# Patient Record
Sex: MALE | NOT HISPANIC OR LATINO | URBAN - METROPOLITAN AREA
[De-identification: names, ages, dates, MRNs, and addresses within clinical notes are randomized per-mention and may not be internally consistent; named-entity substitution may affect disease eponyms.]

---

## 2017-01-03 ENCOUNTER — IMPORTED ENCOUNTER (OUTPATIENT)
Dept: URBAN - METROPOLITAN AREA CLINIC 1 | Facility: CLINIC | Age: 77
End: 2017-01-03

## 2017-01-03 PROBLEM — H25.813: Noted: 2017-01-03

## 2017-01-03 PROCEDURE — 92136 OPHTHALMIC BIOMETRY: CPT

## 2017-01-03 NOTE — PATIENT DISCUSSION
1. Cataract OU:  Visually Significant secondary to glare discussed the risks benefits alternatives and limitations of cataract surgery. The patient stated a full understanding and a desire to proceed with the procedure. Not MF candidate due to amount of Astigmatism/ AMD. Pt understands they will need glasses post-op to for reading/ certain distances. Patient relays he would like specs PO as he does not like carrying glasses around with him he would rather wear them daily. Phaco PCL OS then OD. Toric lens standard technique.

## 2017-01-11 ENCOUNTER — IMPORTED ENCOUNTER (OUTPATIENT)
Dept: URBAN - METROPOLITAN AREA CLINIC 1 | Facility: CLINIC | Age: 77
End: 2017-01-11

## 2017-01-12 ENCOUNTER — IMPORTED ENCOUNTER (OUTPATIENT)
Dept: URBAN - METROPOLITAN AREA CLINIC 1 | Facility: CLINIC | Age: 77
End: 2017-01-12

## 2017-01-12 PROBLEM — Z96.1: Noted: 2017-01-12

## 2017-01-12 PROCEDURE — 99024 POSTOP FOLLOW-UP VISIT: CPT

## 2017-01-12 NOTE — PATIENT DISCUSSION
1. POD#1 CE/IOL Toric OS doing well. Slight increased IOP OS. X 1 gtt Combigan instilled OS in office today. Continue all 3 gtts as prescribed and until gone. Ocuflox TIDDurezol BIDIlevro QDPost op Warnings Reiterated 2. Return for an appointment for H&P OD as scheduled with Dr. Lolis Lua.

## 2017-01-17 ENCOUNTER — IMPORTED ENCOUNTER (OUTPATIENT)
Dept: URBAN - METROPOLITAN AREA CLINIC 1 | Facility: CLINIC | Age: 77
End: 2017-01-17

## 2017-01-17 PROBLEM — H25.811: Noted: 2017-01-17

## 2017-01-17 PROBLEM — Z96.1: Noted: 2017-01-17

## 2017-01-17 PROCEDURE — 92136 OPHTHALMIC BIOMETRY: CPT

## 2017-01-17 NOTE — PATIENT DISCUSSION
1.  Cataract OD: Visually Significant secondary to glare discussed the risks benefits alternatives and limitations of cataract surgery. The patient stated a full understanding and a desire to proceed with the procedure. Pt understands they will need glasses post-op to achieve their best vision. Phaco PCL OD Toric lens standard technique. 2.  POW#1  CE/IOL OS doing well.   Durezol BID OS Ilevro Qdaily OS Ocuflox TID OS

## 2017-01-25 ENCOUNTER — IMPORTED ENCOUNTER (OUTPATIENT)
Dept: URBAN - METROPOLITAN AREA CLINIC 1 | Facility: CLINIC | Age: 77
End: 2017-01-25

## 2017-01-26 ENCOUNTER — IMPORTED ENCOUNTER (OUTPATIENT)
Dept: URBAN - METROPOLITAN AREA CLINIC 1 | Facility: CLINIC | Age: 77
End: 2017-01-26

## 2017-01-26 PROBLEM — Z96.1: Noted: 2017-01-26

## 2017-01-26 PROCEDURE — 99024 POSTOP FOLLOW-UP VISIT: CPT

## 2017-01-26 NOTE — PATIENT DISCUSSION
1. POD#1 CE/IOL Toric OD doing well. Continue all 3 gtts as prescribed and until gone. Ocuflox TIDDurezol BIDIlevro QDPost op Warnings Reiterated 2. POW#2  CE/IOL Toric OS doing well. Continue Durezol BID until gone. Continue Ilevro QD until gone. 3. Return for an appointment for a KR OU as scheduled with Dr. Giovanni De La Fuente.

## 2017-02-20 ENCOUNTER — IMPORTED ENCOUNTER (OUTPATIENT)
Dept: URBAN - METROPOLITAN AREA CLINIC 1 | Facility: CLINIC | Age: 77
End: 2017-02-20

## 2017-02-20 PROBLEM — Z96.1: Noted: 2017-02-20

## 2017-02-20 PROCEDURE — 99024 POSTOP FOLLOW-UP VISIT: CPT

## 2017-02-20 NOTE — PATIENT DISCUSSION
POW#3 Phaco/ PCL OU (Toric) Doing well Finish PO meds through Friday then D/c MRX for glasses given Return for an appointment in 6 months 30 with Dr. Lam Rodriguez.

## 2017-03-03 ENCOUNTER — IMPORTED ENCOUNTER (OUTPATIENT)
Dept: URBAN - METROPOLITAN AREA CLINIC 1 | Facility: CLINIC | Age: 77
End: 2017-03-03

## 2017-03-03 PROBLEM — H16.143: Noted: 2017-03-03

## 2017-03-03 PROBLEM — H26.493: Noted: 2017-03-03

## 2017-03-03 PROBLEM — H35.3131: Noted: 2017-03-03

## 2017-03-03 PROBLEM — Z96.1: Noted: 2017-03-03

## 2017-03-03 PROBLEM — D31.31: Noted: 2017-03-03

## 2017-03-03 PROBLEM — H04.123: Noted: 2017-03-03

## 2017-03-03 PROBLEM — H43.811: Noted: 2017-03-03

## 2017-03-03 PROCEDURE — 92012 INTRM OPH EXAM EST PATIENT: CPT

## 2017-03-03 NOTE — PATIENT DISCUSSION
1.  PCO OU-Visually Significant and yag cap recommended. Risks and benefits discussed with pt and pt desires to schedule yag cap. Will schedule YAG Cap OD then OS at the end of April. 2. Pseudophakia OU - Toric OU 3. ARMD OU mild/dry/stable. Importance of daily AREDS II study multivitamin and Amsler Grid checks discussed with patient. Patient to follow-up immediately with any new onset of decreased vision and/or metamorphopsia. 4. MODESTO w/ PEK OU- Recommend PF ATs TID OU routinely  5. Choroidal Nevus OD: Appears Benign and stable. Observe for changes. 6. PVD w/o Tear OD - Patient was cautioned to call our office immediately if they experience   a substantial change in their symptoms such as an increase in floaters persistent flashes loss of visual field (may appear as a shadow or a curtain) or decrease in visual acuity as these may indicate a retinal tear or detachment. **Will remake glasses after YAG Cap at no cost per PMG. Return for an appointment in After April 26 10/YAG Cap OD then YAG Cap OS 2 weeks later with Dr. Radha Barbosa.

## 2017-05-05 ENCOUNTER — IMPORTED ENCOUNTER (OUTPATIENT)
Dept: URBAN - METROPOLITAN AREA CLINIC 1 | Facility: CLINIC | Age: 77
End: 2017-05-05

## 2017-05-05 PROBLEM — H26.491: Noted: 2017-05-05

## 2017-05-05 PROCEDURE — 66821 AFTER CATARACT LASER SURGERY: CPT

## 2017-05-05 NOTE — PATIENT DISCUSSION
YAG CAP OD: (Consent signed and scanned into attachments) 1 gtt Prolensa applied. The purpose and nature of the procedure possible alternative methods of treatment the risks involved and the possibility of complications were discussed with patient. The Patient wishes to proceed and the consent was signed. The laser was then performed under topical anesthesia with no complications. Post op instructions were given to patient as well as a follow-up appointment. Patient was advised to call our office if any questions or concerns.  RTC as scheduled for 2nd eye

## 2017-05-19 ENCOUNTER — IMPORTED ENCOUNTER (OUTPATIENT)
Dept: URBAN - METROPOLITAN AREA CLINIC 1 | Facility: CLINIC | Age: 77
End: 2017-05-19

## 2017-05-19 PROBLEM — H26.492: Noted: 2017-05-19

## 2017-05-19 PROCEDURE — 66821 AFTER CATARACT LASER SURGERY: CPT

## 2017-05-19 NOTE — PATIENT DISCUSSION
YAG CAP OS: (Consent signed and scanned into attachments) 1 gtt Prolensa applied. The purpose and nature of the procedure possible alternative methods of treatment the risks involved and the possibility of complications were discussed with patient. The Patient wishes to proceed and the consent was signed. The laser was then performed under topical anesthesia with no complications. Post op instructions were given to patient as well as a follow-up appointment. Patient was advised to call our office if any questions or concerns.  RTC 6 weeks PO w/ PMG

## 2017-05-30 ENCOUNTER — IMPORTED ENCOUNTER (OUTPATIENT)
Dept: URBAN - METROPOLITAN AREA CLINIC 1 | Facility: CLINIC | Age: 77
End: 2017-05-30

## 2017-05-30 PROBLEM — H43.811: Noted: 2017-05-30

## 2017-05-30 PROCEDURE — 92012 INTRM OPH EXAM EST PATIENT: CPT

## 2017-05-30 NOTE — PATIENT DISCUSSION
1.  PVD w/o Tear OD - Symptomatic. Reassurance. RD precautions. 2.  Pseudophakia OU - (Toric OU) H/o YAG Cap OU 3. ARMD OU Mild/dry/stable. Importance of daily AREDS II study multivitamin and Amsler Grid checks discussed with patient. Patient to follow-up immediately with any new onset of decreased vision and/or metamorphopsia. 4. MODESTO w/ PEK OU- Use ATs TID OU routinely. 5.  Choroidal Nevus OD: Appears Benign and stable. Observe for changes.

## 2017-08-11 ENCOUNTER — IMPORTED ENCOUNTER (OUTPATIENT)
Dept: URBAN - METROPOLITAN AREA CLINIC 1 | Facility: CLINIC | Age: 77
End: 2017-08-11

## 2017-08-11 PROBLEM — H16.143: Noted: 2017-08-11

## 2017-08-11 PROBLEM — D31.31: Noted: 2017-08-11

## 2017-08-11 PROBLEM — H35.3131: Noted: 2017-08-11

## 2017-08-11 PROBLEM — H04.123: Noted: 2017-08-11

## 2017-08-11 PROBLEM — H43.813: Noted: 2017-08-11

## 2017-08-11 PROBLEM — Z96.1: Noted: 2017-08-11

## 2017-08-11 PROCEDURE — 92012 INTRM OPH EXAM EST PATIENT: CPT

## 2017-08-11 NOTE — PATIENT DISCUSSION
1.  MODESTO w/ PEK OU symptomatic- Increase ATs to QID routinely. Inserted Med Parasol Plugs RLL LLL today with no complications. Silicone Plug RLL and LLL:  Risks benefits and alternatives to placing plug was discussed with patient. Patient understands and would like to proceed. Consent was signed. Post op instructions were discussed/given to patient and patient was advised to call our office if any problems arose. **When pt returns in 3 months if MODESTO controlled refer to Retina for consideration of Vitrectomy. 2.  PVD OU with no holes tears or detachments RD precautions. Refer to Retina for consideration of Vitrecomy next visit if MODESTO controlled. 3.  Pseudophakia OU - (Toric OU) H/o YAG Cap OU 4. ARMD OU Mild/dry/stable. Importance of daily AREDS II study multivitamin and Amsler Grid checks discussed with patient. Patient to follow-up immediately with any new onset of decreased vision and/or metamorphopsia. 5. Choroidal Nevus OD: Appears Benign and stable. Observe for changes. Return for an appointment in 3 mo 10 (check K) with Dr. Anil Steven.

## 2017-11-02 ENCOUNTER — IMPORTED ENCOUNTER (OUTPATIENT)
Dept: URBAN - METROPOLITAN AREA CLINIC 1 | Facility: CLINIC | Age: 77
End: 2017-11-02

## 2017-11-02 PROBLEM — H04.123: Noted: 2017-11-02

## 2017-11-02 PROBLEM — H16.143: Noted: 2017-11-02

## 2017-11-02 PROBLEM — Z96.1: Noted: 2017-11-02

## 2017-11-02 PROCEDURE — 92012 INTRM OPH EXAM EST PATIENT: CPT

## 2017-11-02 NOTE — PATIENT DISCUSSION
1.  MODESTO w/ decreased PEK OU- improved post punctal occlusion but plugs fell out LL's OU. Will hold on replacing plugs at this time. Recommend the start of tear LILLI OU QHS. The continuation of artificial tears OU Q3 hwa were recommended routinely. 2.  Pseudophakia OU (Torics OU)- H/o Yag OU.3. H/o ARMD OU () FHX- mild/dry4. H/o Choroidal Nevus OD5. H/o PVD OU- Pt no longer interested in Vitrectomy6. Return for an appointment for a 27 in March with Dr. Helena Prado.

## 2018-03-06 ENCOUNTER — IMPORTED ENCOUNTER (OUTPATIENT)
Dept: URBAN - METROPOLITAN AREA CLINIC 1 | Facility: CLINIC | Age: 78
End: 2018-03-06

## 2018-03-06 PROBLEM — H43.813: Noted: 2018-03-06

## 2018-03-06 PROBLEM — H35.3131: Noted: 2018-03-06

## 2018-03-06 PROBLEM — Z96.1: Noted: 2018-03-06

## 2018-03-06 PROBLEM — H04.123: Noted: 2018-03-06

## 2018-03-06 PROBLEM — H16.143: Noted: 2018-03-06

## 2018-03-06 PROBLEM — D31.31: Noted: 2018-03-06

## 2018-03-06 PROCEDURE — 92014 COMPRE OPH EXAM EST PT 1/>: CPT

## 2018-03-06 NOTE — PATIENT DISCUSSION
1.  ARMD OU early/dry/stable. Importance of daily AREDS II study multivitamin and Amsler Grid checks discussed with patient. Patient to follow-up immediately with any new onset of decreased vision and/or metamorphopsia. 2. MODESTO w/ increased PEK OU- Progressed OU. Intolerant to [employer]. Recommend Refresh Celluvisc OU QHS. Start Xiidra OU BID (sample RX and coupon given to pt.) Advised pt to instill tear drop prior to Hong Reinaldo. Plugs fell out in past LLs. The continuation of artificial tears OU Q3hwa were recommended. 3.  Choroidal Nevus OD: Appears Benign and stable. Observe for changes. 4. PVD OU- Old stable. 5.  Pseudophakia OU (Torics OU)- H/o Yag OU6. Return for an appointment for a 10/check K and symptoms on Hong Reinaldo in 4-6 weeks with Dr. Mike Severino.

## 2018-04-17 ENCOUNTER — IMPORTED ENCOUNTER (OUTPATIENT)
Dept: URBAN - METROPOLITAN AREA CLINIC 1 | Facility: CLINIC | Age: 78
End: 2018-04-17

## 2018-04-17 PROBLEM — H16.143: Noted: 2018-04-17

## 2018-04-17 PROBLEM — H04.123: Noted: 2018-04-17

## 2018-04-17 PROCEDURE — 92012 INTRM OPH EXAM EST PATIENT: CPT

## 2018-04-17 NOTE — PATIENT DISCUSSION
1.  MODESTO w/ improved PEK OU- Progressed H/o Plugs LLs OU Plugs fell out LLs OU. H/o Intoleramce to Tear LILLI. Continue Refresh Celluvisc OU QHS. Continue Xiidra OU BID. Switch to PF ATs Q3hrs OU w/a (can use more frequently if needed) Advised pt to instill tear drop prior to Derl Slay application. 2.  H/o Choroidal Nevus OD 3. H/o PVD OU 4. Pseudophakia OU (Toric OU)- H/o YAG Cap OU5. H/o ARMD OU early/dryReturn for an appointment in 5-6 mo 10 dfe with Dr. Adriana Lovett.

## 2018-09-11 NOTE — PATIENT DISCUSSION
(H16.042) Marginal corneal ulcer, left eye - Assesment : Examination reveals staph marginal corneal ulcer OS. - Plan : Start Zylet OS tid for 1 week, then may taper. E-Rx sent to pharmacy. Explained condition. Continue ATs prn for comfort. Call if symptoms worsen, do not improve, or new symptoms or vision changes occur. RTC in 1 week for IOP Check, sooner if problems or changes.

## 2018-09-18 NOTE — PATIENT DISCUSSION
(H16.042) Marginal corneal ulcer, left eye - Assesment : Examination reveals staph marginal corneal ulcer OS, much improved. - Plan : Decrease Zylet OS to qdaily for another 3 days, then stop. Recommended regular use of ATs 2-4 times per day and prn. Refresh Jeyson-3 sample given today. Call if symptoms worsen, do not improve, or new symptoms or vision changes occur. RTC in 1-2 months for Exam, sooner if problems or changes.

## 2018-09-18 NOTE — PATIENT DISCUSSION
(G46.156) Keratoconjunct sicca, not specified as Sjogren's, bilateral - Assesment : Examination revealed Dry Eye Syndrome - Plan : Monitor for changes. Explained ALIA and handout given. Recommended regular use of ATs 2-4 times per day and prn. Refresh Jeyson-3 sample given today.

## 2018-10-29 NOTE — PATIENT DISCUSSION
(X22.864) Keratoconjunct sicca, not specified as Sjogren's, bilateral - Assesment : Examination revealed Dry Eye Syndrome - Plan : Monitor for changes. Recommended regular use of ATs 2-4 times per day and prn.

## 2018-10-29 NOTE — PATIENT DISCUSSION
(J35.860) Peripheral opacity of cornea, left eye - Assesment : Peripheral Scar @ 5:00 OS. s/p marginal ulcer OS. Hx of Lasik. - Plan : Refer to Corneal Specialist for evaluation and possible treatment.

## 2018-10-29 NOTE — PATIENT DISCUSSION
(H25.13) Age-related nuclear cataract, bilateral - Assesment : Examination revealed cataract. Mild symptoms. Hx of Monovision Lasik. - Plan : Monitor for changes. Advised patient to call our office with decreased vision or increased symptoms. Updated GLRx given today.

## 2018-11-19 ENCOUNTER — IMPORTED ENCOUNTER (OUTPATIENT)
Dept: URBAN - METROPOLITAN AREA CLINIC 1 | Facility: CLINIC | Age: 78
End: 2018-11-19

## 2018-11-19 PROBLEM — H35.033: Noted: 2018-11-19

## 2018-11-19 PROBLEM — Z96.1: Noted: 2018-11-19

## 2018-11-19 PROBLEM — H43.813: Noted: 2018-11-19

## 2018-11-19 PROBLEM — H16.143: Noted: 2018-11-19

## 2018-11-19 PROBLEM — H04.123: Noted: 2018-11-19

## 2018-11-19 PROBLEM — D31.31: Noted: 2018-11-19

## 2018-11-19 PROBLEM — H35.3131: Noted: 2018-11-19

## 2018-11-19 PROCEDURE — 99213 OFFICE O/P EST LOW 20 MIN: CPT

## 2018-11-19 NOTE — PATIENT DISCUSSION
1.  ARMD OU early/dry/stable. Importance of daily AREDS II study multivitamin and Amsler Grid checks discussed with patient. Patient to follow-up immediately with any new onset of decreased vision and/or metamorphopsia. 2. Choroidal Nevus OD: Appears Benign and stable. Observe for changes. 3. MODESTO w/ PEK OU- Unable to continue Ela Horse due to cost. No improvement w/ increased tear use. Start Restasis OU BID. Plugs fell out in past LLs OU. The continuation of PF artificial tears were recommended. Continue Refresh Celluvisc OU QHS. 4.  PVD OU- Old stable. 5.  GR I Hypertensive Retinopathy OU- continue HTN Control6. Pseudophakia OU- Torics OU- Doing well. 7. Return for an appointment for 10/check K on Restasis March w/ Dr. Paige Ortiz.

## 2019-03-19 ENCOUNTER — IMPORTED ENCOUNTER (OUTPATIENT)
Dept: URBAN - METROPOLITAN AREA CLINIC 1 | Facility: CLINIC | Age: 79
End: 2019-03-19

## 2019-03-19 PROBLEM — Z96.1: Noted: 2019-03-19

## 2019-03-19 PROBLEM — H35.3131: Noted: 2019-03-19

## 2019-03-19 PROBLEM — D31.31: Noted: 2019-03-19

## 2019-03-19 PROBLEM — H16.143: Noted: 2019-03-19

## 2019-03-19 PROBLEM — H43.813: Noted: 2019-03-19

## 2019-03-19 PROBLEM — D31.32: Noted: 2019-03-19

## 2019-03-19 PROBLEM — H04.123: Noted: 2019-03-19

## 2019-03-19 PROCEDURE — 99213 OFFICE O/P EST LOW 20 MIN: CPT

## 2019-03-19 NOTE — PATIENT DISCUSSION
1.  ARMD OU early/dry/stable. Importance of daily AREDS II study multivitamin and Amsler Grid checks discussed with patient. Patient to follow-up immediately with any new onset of decreased vision and/or metamorphopsia. 2. MODESTO w/ PEK OU- Stable on Restasis BID OU. (Unable to continue Andres Tichnor due to cost) Plugs fell out in past LLs OU. Patient saw no improvment with Plugs OU. The continuation of PF artificial tears were recommended Q1-2hrs. Continue Refresh Celluvisc OU QHS. 3.  Choroidal Nevus OD: Appears Benign and stable. Observe for changes. 4. PVD OU- Old stable. 5.  GR I Hypertensive Retinopathy OU- continue HTN Control6. Pseudophakia OU- Torics OU- Doing well. Return for an appointment in 6 months 10/dfe k check with Dr. Felicia Hathaway.

## 2019-09-24 ENCOUNTER — IMPORTED ENCOUNTER (OUTPATIENT)
Dept: URBAN - METROPOLITAN AREA CLINIC 1 | Facility: CLINIC | Age: 79
End: 2019-09-24

## 2019-09-24 PROBLEM — H16.143: Noted: 2019-09-24

## 2019-09-24 PROBLEM — H35.3131: Noted: 2019-09-24

## 2019-09-24 PROBLEM — H04.123: Noted: 2019-09-24

## 2019-09-24 PROCEDURE — 92012 INTRM OPH EXAM EST PATIENT: CPT

## 2021-08-03 NOTE — PATIENT DISCUSSION
1.  ARMD OU Early/dry/stable. Importance of daily AREDS II study multivitamin and Amsler Grid checks discussed with patient. Patient to follow-up immediately with any new onset of decreased vision and/or metamorphopsia. 2. MODESTO w/ PEK OU- (Unable to continue Hong Reinaldo due to cost) H/o Plugs falling out in past. Patient saw no improvement with Plugs OU. Cont PF ATs Q1-2hrs. Continue Refresh Celluvisc OU QHS. Cont Restasis BID OU Routinely. 3.  Choroidal Nevus OD: Appears Benign and stable. Observe for changes. 4. PVD OU- Stable RD precautions. 5.  GR I Hypertensive Retinopathy OU- Continue HTN Control6. Pseudophakia OU- (Toric OU) H/o YAG Cap OU Return for an appointment in 6 mo 27 with Dr. Annamarie Burnham.
MODESTO w/ PEK OU-The use/continuation of artificial tears were recommended.
admission

## 2022-04-02 ASSESSMENT — KERATOMETRY
OS_AXISANGLE2_DEGREES: 080
OS_K2POWER_DIOPTERS: 44.00
OS_AXISANGLE_DEGREES: 170
OD_AXISANGLE_DEGREES: 174
OS_K1POWER_DIOPTERS: 45.50
OD_K1POWER_DIOPTERS: 45.75
OD_K2POWER_DIOPTERS: 44.00
OD_AXISANGLE2_DEGREES: 065

## 2022-04-02 ASSESSMENT — TONOMETRY
OS_IOP_MMHG: 15
OS_IOP_MMHG: 15
OS_IOP_MMHG: 12
OS_IOP_MMHG: 17
OD_IOP_MMHG: 14
OS_IOP_MMHG: 13
OD_IOP_MMHG: 15
OS_IOP_MMHG: 14
OS_IOP_MMHG: 14
OD_IOP_MMHG: 13
OS_IOP_MMHG: 14
OS_IOP_MMHG: 14
OD_IOP_MMHG: 14
OS_IOP_MMHG: 13
OD_IOP_MMHG: 13
OD_IOP_MMHG: 15
OS_IOP_MMHG: 14
OD_IOP_MMHG: 14
OD_IOP_MMHG: 16
OS_IOP_MMHG: 14
OS_IOP_MMHG: 22
OD_IOP_MMHG: 14
OD_IOP_MMHG: 14
OS_IOP_MMHG: 14

## 2022-04-02 ASSESSMENT — VISUAL ACUITY
OD_CC: J1
OD_CC: 20/25-2
OD_GLARE: 20/80
OS_CC: 20/25
OS_CC: 20/25
OD_GLARE: 20/60
OD_GLARE: 20/80
OS_SC: 20/30
OD_SC: 20/40+2
OD_CC: 20/40
OS_SC: 20/30
OS_SC: 20/30
OS_CC: J1
OS_CC: J2
OD_SC: 20/40
OS_SC: 20/40+2
OD_SC: 20/30
OS_CC: 20/25
OS_CC: 20/25
OD_SC: 20/30
OS_GLARE: 20/80
OS_SC: 20/20
OD_CC: 20/40
OD_CC: 20/25-2
OS_CC: J1
OD_SC: 20/30
OD_CC: J2
OD_GLARE: 20/60
OS_CC: 20/30
OS_GLARE: 20/60
OS_SC: 20/25
OD_CC: J1
OD_SC: 20/25
OD_SC: 20/30
OS_SC: 20/25
OS_SC: 20/25
OD_SC: 20/25
OS_GLARE: 20/80
OS_CC: J2
OD_CC: 20/30-2
OD_SC: 20/20
OD_CC: J2
OS_CC: 20/25

## 2023-10-24 ENCOUNTER — EMERGENCY VISIT (OUTPATIENT)
Dept: URBAN - METROPOLITAN AREA CLINIC 1 | Facility: CLINIC | Age: 83
End: 2023-10-24

## 2023-10-24 DIAGNOSIS — Z96.1: ICD-10-CM

## 2023-10-24 DIAGNOSIS — H35.363: ICD-10-CM

## 2023-10-24 DIAGNOSIS — H43.813: ICD-10-CM

## 2023-10-24 DIAGNOSIS — D31.31: ICD-10-CM

## 2023-10-24 DIAGNOSIS — H16.143: ICD-10-CM

## 2023-10-24 DIAGNOSIS — H35.033: ICD-10-CM

## 2023-10-24 DIAGNOSIS — H04.123: ICD-10-CM

## 2023-10-24 PROCEDURE — 92014 COMPRE OPH EXAM EST PT 1/>: CPT

## 2023-10-24 ASSESSMENT — TONOMETRY
OD_IOP_MMHG: 15
OS_IOP_MMHG: 15

## 2023-10-24 ASSESSMENT — VISUAL ACUITY
OS_SC: 20/30-1
OU_SC: 20/30
OD_SC: 20/30-2

## 2024-10-24 ENCOUNTER — COMPREHENSIVE EXAM (OUTPATIENT)
Dept: URBAN - METROPOLITAN AREA CLINIC 1 | Facility: CLINIC | Age: 84
End: 2024-10-24

## 2024-10-24 DIAGNOSIS — H04.123: ICD-10-CM

## 2024-10-24 DIAGNOSIS — H35.363: ICD-10-CM

## 2024-10-24 DIAGNOSIS — H35.033: ICD-10-CM

## 2024-10-24 DIAGNOSIS — Z96.1: ICD-10-CM

## 2024-10-24 DIAGNOSIS — H16.143: ICD-10-CM

## 2024-10-24 DIAGNOSIS — H43.813: ICD-10-CM

## 2024-10-24 DIAGNOSIS — D31.31: ICD-10-CM

## 2024-10-24 DIAGNOSIS — S00.11XA: ICD-10-CM

## 2024-10-24 PROCEDURE — 92014 COMPRE OPH EXAM EST PT 1/>: CPT
